# Patient Record
Sex: FEMALE | Race: OTHER | NOT HISPANIC OR LATINO | URBAN - METROPOLITAN AREA
[De-identification: names, ages, dates, MRNs, and addresses within clinical notes are randomized per-mention and may not be internally consistent; named-entity substitution may affect disease eponyms.]

---
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
## 2023-01-25 NOTE — ED PROVIDER NOTE - NSFOLLOWUPINSTRUCTIONS_ED_ALL_ED_FT
Left third message for patient to schedule appointment to see Dr. Maicol Cassidy for AAA.
See your doctor in 1-2 days.   Use cool mist humidifier in room at night.        Keep hydrated by drinking water.                  Log Out.      [employer] CareNotes®     :  [facility]  	                       LARYNGITIS - General Information           Laryngitis    WHAT YOU NEED TO KNOW:    What is laryngitis? Laryngitis is inflammation of your larynx (voice box). The larynx holds your vocal cords. Your vocal cords usually open and close easily to form sounds. With laryngitis, your vocal cords swell and become irritated. This may change how your voice sounds, or you may lose your voice for a short while.    What causes laryngitis?   •Overuse of voice from shouting, singing loudly, or speaking loudly      •Irritation from smoke, pollution, chemicals, or pollen      •Infection with a virus, bacteria, or fungus      •Certain medical conditions, such as acid reflux      What are the signs and symptoms of laryngitis?   •A weak voice or loss of voice      •Hoarse, raspy voice      •Sore, dry, raw throat      •Clearing your throat often      •Dry cough      How is laryngitis diagnosed? Your healthcare provider may ask about your symptoms and examine your throat. He or she may use a light and tiny mirror to see your vocal cords. Instead, a thin, flexible tube may be inserted through your mouth to the back of your throat. This is called a laryngoscopy. Sometimes, a small sample of tissue from the area is needed. This is called a biopsy. The tissue sample is sent to a lab for tests.    How is laryngitis treated? Laryngitis usually gets better on its own within 1 to 2 weeks. Medicines such as steroids or antibiotics may be used in some cases.    How can I manage my symptoms?   •Rest your voice. Try to talk as little as possible.      •Use a cool mist humidifier to increase air moisture in your home. This may soothe your throat and decrease your cough.      •Keep your mouth and throat moist. Suck on a throat lozenge or chew sugarless gum.      •Do not smoke, and avoid secondhand smoke. Nicotine and other chemicals in cigarettes and cigars can cause dryness and irritation in your throat and vocal cords. Ask your healthcare provider for information if you currently smoke and need help to quit. E-cigarettes or smokeless tobacco still contain nicotine. Talk to your healthcare provider before you use these products.      •Drink liquids as directed. You may need to drink extra liquids to help soothe your throat. Water or warm tea are good liquids to drink.      •Avoid spicy and acidic foods. These may irritate your throat. Examples include citrus, salad dressings, and hot sauces. Carbonated drinks may also cause discomfort in your throat.      •Try not to clear your throat. This can cause more irritation and swelling of your vocal cords.      Call your local emergency number (911 in the [place]) if:   •You have sudden trouble breathing.      •You have severe drooling or trouble swallowing.      When should I seek immediate care?   •You cough up blood.      •You have severe pain.      When should I call my doctor?   •Your symptoms last longer than 2 weeks.      •You have questions or concerns about your condition or care.      CARE AGREEMENT:    You have the right to help plan your care. Learn about your health condition and how it may be treated. Discuss treatment options with your healthcare providers to decide what care you want to receive. You always have the right to refuse treatment.        © Copyright [employer] 2022           back to top                          © Copyright [employer] 2022

## 2023-03-09 ENCOUNTER — NON-APPOINTMENT (OUTPATIENT)
Age: 18
End: 2023-03-09

## 2023-04-25 ENCOUNTER — NON-APPOINTMENT (OUTPATIENT)
Age: 18
End: 2023-04-25

## 2023-04-26 ENCOUNTER — EMERGENCY (EMERGENCY)
Facility: HOSPITAL | Age: 18
LOS: 0 days | Discharge: LEFT BEFORE DISCHARGE | End: 2023-04-26
Attending: EMERGENCY MEDICINE
Payer: COMMERCIAL

## 2023-04-26 VITALS
TEMPERATURE: 98 F | HEART RATE: 88 BPM | RESPIRATION RATE: 20 BRPM | OXYGEN SATURATION: 100 % | SYSTOLIC BLOOD PRESSURE: 95 MMHG | DIASTOLIC BLOOD PRESSURE: 65 MMHG

## 2023-04-26 DIAGNOSIS — Z86.59 PERSONAL HISTORY OF OTHER MENTAL AND BEHAVIORAL DISORDERS: ICD-10-CM

## 2023-04-26 DIAGNOSIS — N93.9 ABNORMAL UTERINE AND VAGINAL BLEEDING, UNSPECIFIED: ICD-10-CM

## 2023-04-26 DIAGNOSIS — Z53.29 PROCEDURE AND TREATMENT NOT CARRIED OUT BECAUSE OF PATIENT'S DECISION FOR OTHER REASONS: ICD-10-CM

## 2023-04-26 LAB
ALBUMIN SERPL ELPH-MCNC: 3.8 G/DL — SIGNIFICANT CHANGE UP (ref 3.3–5)
ALP SERPL-CCNC: 63 U/L — SIGNIFICANT CHANGE UP (ref 40–120)
ALT FLD-CCNC: 42 U/L — SIGNIFICANT CHANGE UP (ref 12–78)
ANION GAP SERPL CALC-SCNC: 3 MMOL/L — LOW (ref 5–17)
APPEARANCE UR: CLEAR — SIGNIFICANT CHANGE UP
AST SERPL-CCNC: 23 U/L — SIGNIFICANT CHANGE UP (ref 15–37)
BASOPHILS # BLD AUTO: 0.06 K/UL — SIGNIFICANT CHANGE UP (ref 0–0.2)
BASOPHILS NFR BLD AUTO: 0.8 % — SIGNIFICANT CHANGE UP (ref 0–2)
BILIRUB SERPL-MCNC: 0.2 MG/DL — SIGNIFICANT CHANGE UP (ref 0.2–1.2)
BILIRUB UR-MCNC: NEGATIVE — SIGNIFICANT CHANGE UP
BUN SERPL-MCNC: 9 MG/DL — SIGNIFICANT CHANGE UP (ref 7–23)
CALCIUM SERPL-MCNC: 9.2 MG/DL — SIGNIFICANT CHANGE UP (ref 8.5–10.1)
CHLORIDE SERPL-SCNC: 107 MMOL/L — SIGNIFICANT CHANGE UP (ref 96–108)
CO2 SERPL-SCNC: 27 MMOL/L — SIGNIFICANT CHANGE UP (ref 22–31)
COLOR SPEC: YELLOW — SIGNIFICANT CHANGE UP
CREAT SERPL-MCNC: 0.73 MG/DL — SIGNIFICANT CHANGE UP (ref 0.5–1.3)
DIFF PNL FLD: ABNORMAL
EGFR: 121 ML/MIN/1.73M2 — SIGNIFICANT CHANGE UP
EOSINOPHIL # BLD AUTO: 0.08 K/UL — SIGNIFICANT CHANGE UP (ref 0–0.5)
EOSINOPHIL NFR BLD AUTO: 1 % — SIGNIFICANT CHANGE UP (ref 0–6)
GLUCOSE SERPL-MCNC: 93 MG/DL — SIGNIFICANT CHANGE UP (ref 70–99)
GLUCOSE UR QL: NEGATIVE — SIGNIFICANT CHANGE UP
HCG SERPL-ACNC: <1 MIU/ML — SIGNIFICANT CHANGE UP
HCT VFR BLD CALC: 40.6 % — SIGNIFICANT CHANGE UP (ref 34.5–45)
HGB BLD-MCNC: 13.5 G/DL — SIGNIFICANT CHANGE UP (ref 11.5–15.5)
IMM GRANULOCYTES NFR BLD AUTO: 0.3 % — SIGNIFICANT CHANGE UP (ref 0–0.9)
KETONES UR-MCNC: NEGATIVE — SIGNIFICANT CHANGE UP
LEUKOCYTE ESTERASE UR-ACNC: ABNORMAL
LIDOCAIN IGE QN: 173 U/L — SIGNIFICANT CHANGE UP (ref 73–393)
LYMPHOCYTES # BLD AUTO: 2.27 K/UL — SIGNIFICANT CHANGE UP (ref 1–3.3)
LYMPHOCYTES # BLD AUTO: 29.4 % — SIGNIFICANT CHANGE UP (ref 13–44)
MCHC RBC-ENTMCNC: 27.8 PG — SIGNIFICANT CHANGE UP (ref 27–34)
MCHC RBC-ENTMCNC: 33.3 GM/DL — SIGNIFICANT CHANGE UP (ref 32–36)
MCV RBC AUTO: 83.7 FL — SIGNIFICANT CHANGE UP (ref 80–100)
MONOCYTES # BLD AUTO: 0.55 K/UL — SIGNIFICANT CHANGE UP (ref 0–0.9)
MONOCYTES NFR BLD AUTO: 7.1 % — SIGNIFICANT CHANGE UP (ref 2–14)
NEUTROPHILS # BLD AUTO: 4.74 K/UL — SIGNIFICANT CHANGE UP (ref 1.8–7.4)
NEUTROPHILS NFR BLD AUTO: 61.4 % — SIGNIFICANT CHANGE UP (ref 43–77)
NITRITE UR-MCNC: NEGATIVE — SIGNIFICANT CHANGE UP
PH UR: 7 — SIGNIFICANT CHANGE UP (ref 5–8)
PLATELET # BLD AUTO: 308 K/UL — SIGNIFICANT CHANGE UP (ref 150–400)
POTASSIUM SERPL-MCNC: 3.9 MMOL/L — SIGNIFICANT CHANGE UP (ref 3.5–5.3)
POTASSIUM SERPL-SCNC: 3.9 MMOL/L — SIGNIFICANT CHANGE UP (ref 3.5–5.3)
PROT SERPL-MCNC: 8.3 GM/DL — SIGNIFICANT CHANGE UP (ref 6–8.3)
PROT UR-MCNC: NEGATIVE — SIGNIFICANT CHANGE UP
RBC # BLD: 4.85 M/UL — SIGNIFICANT CHANGE UP (ref 3.8–5.2)
RBC # FLD: 13.6 % — SIGNIFICANT CHANGE UP (ref 10.3–14.5)
SODIUM SERPL-SCNC: 137 MMOL/L — SIGNIFICANT CHANGE UP (ref 135–145)
SP GR SPEC: 1.01 — SIGNIFICANT CHANGE UP (ref 1.01–1.02)
UROBILINOGEN FLD QL: NEGATIVE — SIGNIFICANT CHANGE UP
WBC # BLD: 7.72 K/UL — SIGNIFICANT CHANGE UP (ref 3.8–10.5)
WBC # FLD AUTO: 7.72 K/UL — SIGNIFICANT CHANGE UP (ref 3.8–10.5)

## 2023-04-26 PROCEDURE — 81001 URINALYSIS AUTO W/SCOPE: CPT

## 2023-04-26 PROCEDURE — 87086 URINE CULTURE/COLONY COUNT: CPT

## 2023-04-26 PROCEDURE — 86850 RBC ANTIBODY SCREEN: CPT

## 2023-04-26 PROCEDURE — 76817 TRANSVAGINAL US OBSTETRIC: CPT | Mod: 26

## 2023-04-26 PROCEDURE — 86900 BLOOD TYPING SEROLOGIC ABO: CPT

## 2023-04-26 PROCEDURE — 76817 TRANSVAGINAL US OBSTETRIC: CPT

## 2023-04-26 PROCEDURE — 99284 EMERGENCY DEPT VISIT MOD MDM: CPT

## 2023-04-26 PROCEDURE — 36415 COLL VENOUS BLD VENIPUNCTURE: CPT

## 2023-04-26 PROCEDURE — 99284 EMERGENCY DEPT VISIT MOD MDM: CPT | Mod: 25

## 2023-04-26 PROCEDURE — 86901 BLOOD TYPING SEROLOGIC RH(D): CPT

## 2023-04-26 PROCEDURE — 85025 COMPLETE CBC W/AUTO DIFF WBC: CPT

## 2023-04-26 PROCEDURE — 83690 ASSAY OF LIPASE: CPT

## 2023-04-26 PROCEDURE — 84702 CHORIONIC GONADOTROPIN TEST: CPT

## 2023-04-26 PROCEDURE — 80053 COMPREHEN METABOLIC PANEL: CPT

## 2023-04-26 RX ORDER — SODIUM CHLORIDE 9 MG/ML
1000 INJECTION INTRAMUSCULAR; INTRAVENOUS; SUBCUTANEOUS ONCE
Refills: 0 | Status: COMPLETED | OUTPATIENT
Start: 2023-04-26 | End: 2023-04-26

## 2023-04-26 RX ADMIN — SODIUM CHLORIDE 1000 MILLILITER(S): 9 INJECTION INTRAMUSCULAR; INTRAVENOUS; SUBCUTANEOUS at 20:43

## 2023-04-26 NOTE — ED STATDOCS - CLINICAL SUMMARY MEDICAL DECISION MAKING FREE TEXT BOX
patient with +home preg test, s/p plan b x2, LMP 3/28. Sent in by UC for further workup. Plan for labs HCG, TVUS and reassess.

## 2023-04-26 NOTE — ED STATDOCS - PROGRESS NOTE DETAILS
16 yo female with no significant PMH presents with vaginal spotting x 2 days. Pt took 2 home preg test which were positive and went to urgent care and was told that their preg test was negative. Pt has 2 episodes of unprotected intercourse and took plan B both times. Pt's LMP was 2/28/23 and is normally regular. Will check labs, UA, hcg, sono and reeval. -Topher Bob PA-C HCG is negative. Discussed with pt. Possible spotting or irregular periods after taking plan B 2x. Pending sono. -Topher Bob PA-C Pt does not want to wait for the sono. Pt is willing to sign out AMA.   Had an at length discussion with patient.  Patient wishes to leave at this time.  The patient understands that they are leaving against medical advice despite the risk of missing a potentially serious diagnosis which may lead to injury, disability and/or death.  I discussed with the patient which tests would need to be performed and what type of monitoring would be necessary for the patient as well.  I was unable to convince patient to stay for further workup.  The patient was given an opportunity to ask any questions as well.   The patient demonstrates understanding of all risks, is awake and alert and demonstrates competence to make medical decisions.  The patient understands that they may return at any time if desired. Discussed the importance of close, prompt medical follow-up. -Topher Bob PA-C

## 2023-04-26 NOTE — ED STATDOCS - NSFOLLOWUPINSTRUCTIONS_ED_ALL_ED_FT
Follow up with your GYN for further evaluation of the bleeding.   Return to the ER for any new or other concerns.

## 2023-04-26 NOTE — ED STATDOCS - PHYSICAL EXAMINATION
General: AAOx3, NAD  HEENT: NCAT  Cardiac: Normal rate and rhythym, no murmurs, normal peripheral perfusion  Respiratory: Normal rate and effort. CTAB  GI: Soft, nondistended, +minimal b/l lower abd tenderness   Neuro: No focal deficits. CHAPARRO equally x4, sensation to light touch intact throughout  MSK: FROMx4, no focal bony tenderness, no peripheral edema  Skin: No rash

## 2023-04-26 NOTE — ED ADULT NURSE NOTE - OBJECTIVE STATEMENT
Ambulatory to ER for confirmation of pregnancy. Patient had positive pregnancy test at home; negative pregnancy test at urgent care; sent to ER for further eval. Patient c/o vaginal bleeding x 2 days; a & ox4 respirations even and unlabored on room air. Await MD davila.

## 2023-04-26 NOTE — ED ADULT NURSE NOTE - CHIEF COMPLAINT QUOTE
pt ambulatory to triage from , pt states she took a pregnancy problem at home that was positive. pt went to  and tests did not confirm pregnancy. pt states she has been having spotting. pt was advised to come to ED for further testing. allergic to sulfa. pmh migranes

## 2023-04-26 NOTE — ED STATDOCS - PATIENT PORTAL LINK FT
You can access the FollowMyHealth Patient Portal offered by [facility] by registering at the following website: [email or web address]. By joining [employer]’s FollowMyHealth portal, you will also be able to view your health information using other applications (apps) compatible with our system.

## 2023-04-26 NOTE — ED STATDOCS - OBJECTIVE STATEMENT
17 year old female presents to the ED after taking two home pregnancy tests x2 positive, but was seen at  where urine test was negative for pregnancy. Sent to ED for further testing. States she's been spotting x2 days, not like during menstrual cycle. LMP: 3/28. Patient is sexually active, state's she's used plan B x2. Also reports recent bacterial infection, placed on antibiotics but only took for a day.

## 2023-04-26 NOTE — ED ADULT TRIAGE NOTE - CHIEF COMPLAINT QUOTE
pt ambulatory to triage from , pt states she took a pregnancy problem at home that was positive. pt went to  and tests did not confirm pregnancy. pt states she has been having spotting. pt was advised to come to ED for further testing. allergic to sulfa. pmh migranes [Mother] : mother [Expressed Breast milk ___oz/feed] : [unfilled] oz of expressed breast milk per feed [Formula ___ oz/feed] : [unfilled] oz of formula per feed [Hours between feeds ___] : Child is fed every [unfilled] hours [Normal] : Normal [In Bassin[place]/Crib] : sleeps in bassinette/crib [On back] : sleeps on back [Pacifier use] : Pacifier use [Water heater temperature set at <120 degrees F] : Water heater temperature set at <120 degrees F [Yes] : Cigarette smoke exposure [Rear facing car seat in back seat] : Rear facing car seat in back seat [Carbon Monoxide Detectors] : Carbon monoxide detectors at home [Smoke Detectors] : Smoke detectors at home. [Exposure to electronic nicotine delivery system] : No exposure to electronic nicotine delivery system [FreeTextEntry7] : 1 month Well Visit [de-identified] : Hep B [FreeTextEntry1] : No c/o, pt doing well.  \par No feeding difficulties, no sweating with feeding \par Prenatal heart rate was 140-150, had decels and nuchal cord x 1 at delivery, no other complications\par MGF suffered a stroke in his late 40's\par

## 2023-04-27 NOTE — ED POST DISCHARGE NOTE - REASON FOR FOLLOW-UP
Other Patient requesting US report. Signed out AMA yesterday prior to reading. Report reviewed over phone. Also advised of negative HCG. - Perry Bar PA-C

## 2023-04-28 LAB
CULTURE RESULTS: SIGNIFICANT CHANGE UP
SPECIMEN SOURCE: SIGNIFICANT CHANGE UP

## 2023-08-13 ENCOUNTER — NON-APPOINTMENT (OUTPATIENT)
Age: 18
End: 2023-08-13

## 2023-09-27 ENCOUNTER — NON-APPOINTMENT (OUTPATIENT)
Age: 18
End: 2023-09-27

## 2023-11-24 ENCOUNTER — NON-APPOINTMENT (OUTPATIENT)
Age: 18
End: 2023-11-24

## 2024-01-08 ENCOUNTER — NON-APPOINTMENT (OUTPATIENT)
Age: 19
End: 2024-01-08

## 2024-02-19 ENCOUNTER — EMERGENCY (EMERGENCY)
Facility: HOSPITAL | Age: 19
LOS: 0 days | Discharge: HOME | End: 2024-02-19
Attending: STUDENT IN AN ORGANIZED HEALTH CARE EDUCATION/TRAINING PROGRAM
Payer: COMMERCIAL

## 2024-02-19 VITALS
HEART RATE: 85 BPM | DIASTOLIC BLOOD PRESSURE: 90 MMHG | TEMPERATURE: 98 F | OXYGEN SATURATION: 99 % | SYSTOLIC BLOOD PRESSURE: 127 MMHG | RESPIRATION RATE: 18 BRPM

## 2024-02-19 VITALS — HEIGHT: 60 IN | WEIGHT: 205.03 LBS
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
## 2024-02-19 DIAGNOSIS — S09.90XA UNSPECIFIED INJURY OF HEAD, INITIAL ENCOUNTER: ICD-10-CM
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
## 2024-02-19 DIAGNOSIS — F07.81 POSTCONCUSSIONAL SYNDROME: ICD-10-CM
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
## 2024-02-19 PROCEDURE — 99283 EMERGENCY DEPT VISIT LOW MDM: CPT

## 2024-02-19 NOTE — ED PROVIDER NOTE - PATIENT PORTAL LINK FT
You can access the FollowMyHealth Patient Portal offered by [facility] by registering at the following website: [email or web address]. By joining [employer]’s FollowMyHealth portal, you will also be able to view your health information using other applications (apps) compatible with our system.

## 2024-02-19 NOTE — ED PROVIDER NOTE - NS ED ATTENDING STATEMENT MOD
Subjective:       Patient ID: Jerome Almazan is a 53 y.o. male.    Chief Complaint: Executive Health    HPI Mr Almazan is a very pleasant gentleman from [place], [place] here for his Executive Health exam. Overall doing well and had no specific complaints. We discussed however the fact that he has experienced lower back pain for the last couple of months. He has not had any imaging studies to look into this, but he recently had a CT scan of his abdomen for pain issues he reported to his doctor, that included, among the images obtained, a representation of his spinal column. I showed Mr Almazan how this study showed the loss of the lumbar lordosis indicating the probability of lower back muscles strain. I gave hin a limited prescription for Gabapentin with instructions to take at bedtime as needed. I also recommended certain core strengthening exercises that should help in the long run. Otherwise he is doing well. He is very active physically, exercises regularly and has maintained his weight.  I am sending copies of his studies including blood work that showed no significant changes from previous tests. PSA was stable at 3.0 as were other parameters tested including normal EKG.  Review of Systems   All other systems reviewed and are negative.      Objective:      Physical Exam   Constitutional: He is oriented to person, place, and time. He appears well-developed and well-nourished. No distress.   HENT:   Head: Normocephalic and atraumatic.   Right Ear: External ear normal.   Left Ear: External ear normal.   Mouth/Throat: Oropharynx is clear and moist. No oropharyngeal exudate.   Eyes: Pupils are equal, round, and reactive to light. Conjunctivae and EOM are normal. Right eye exhibits no discharge. Left eye exhibits no discharge. No scleral icterus.   Neck: Normal range of motion. Neck supple. No JVD present. No thyromegaly present.   Cardiovascular: Normal rate, regular rhythm, normal heart sounds and intact distal  pulses.   No murmur heard.  Pulmonary/Chest: Effort normal and breath sounds normal. No respiratory distress. He has no wheezes. He exhibits no tenderness.   Abdominal: Soft. Bowel sounds are normal. He exhibits no distension and no mass. There is no tenderness.   Musculoskeletal: Normal range of motion. He exhibits no edema or tenderness.   Lymphadenopathy:     He has no cervical adenopathy.   Neurological: He is alert and oriented to person, place, and time. No cranial nerve deficit. Coordination normal.   Skin: Skin is warm and dry. No rash noted. He is not diaphoretic. No erythema.   Psychiatric: He has a normal mood and affect. His behavior is normal. Judgment and thought content normal.   Nursing note and vitals reviewed.      Assessment:       1. Routine general medical examination at a health care facility    2. Strain of lumbar region, sequela     3.     Hypertension - on treatmet.  Plan:    1. Provided prescription for Gabapentin 300 mgs at bedtime as needed.         2. Continue with current regular medications.         3. Return to clinic in 1 year or sooner if needed.     Attending with

## 2024-02-19 NOTE — ED PROVIDER NOTE - ATTENDING CONTRIBUTION TO CARE
I, Johnson Herbert DO,  performed the initial face to face bedside interview with this patient regarding history of present illness, review of symptoms and relevant past medical, social and family history.  I completed an independent physical examination.  I was the initial provider who evaluated this patient. I have signed out the follow up of any pending tests (i.e. labs, radiological studies) to the resident.  I have communicated the patient’s plan of care and disposition with the resident.  The history, relevant review of systems, past medical and surgical history, medical decision making, and physical examination was documented by the scribe in my presence and I attest to the accuracy of the documentation.    shared pe  · Physical Examination: GENERAL: no acute distress, non-toxic appearing  	HEAD: normocephalic, atraumatic, no skull deformities or tenderness  	HEENT: PERRLA, EOMI, normal conjunctiva, oral mucosa moist, full ROM of neck  	CARDIAC: regular rate and rhythm, no appreciable murmurs  	PULM: clear to ascultation bilaterally  	GI: abdomen nondistended, soft, nontender  	NEURO: alert and oriented x 3, normal speech, no focal motor or sensory deficits, gait normal, no gross neurologic deficit, CN intact  	MSK: no visible deformities  	SKIN: no visible rashes, dry, well-perfused  PSYCH: appropriate mood and affect

## 2024-02-19 NOTE — ED PROVIDER NOTE - OBJECTIVE STATEMENT
Patient is a 18y F PMHx migraines p/w head injury. Patient states 2 days ago she was at work and was bent over when a large box with ink cartridges fell on her head. Patient states she vomiting x1 at time of injury. Since then she has felt a little nauseous, no vomiting. Patient state she is often nauseous at baseline. Patient also feeling some dizziness and posterior headache, but is able to ambulate and does not feel off balance. Patient denies LOC at time of incident, not on a/c, no period of confusion. Denies numbness/weakness, gait instability, vision changes.

## 2024-02-19 NOTE — ED ADULT TRIAGE NOTE - CHIEF COMPLAINT QUOTE
Pt presents to ED sent from urgent care for head CT. Pt states she had boxes of ink cartridges fall on her head at work on Saturday Pt states "On Saturday after iot happened I threw up, then yesterday I felt dizzy, nauseous and had a persistent headache." I just continue to feel nauseous w/ a headache. No neuro deficit observed, no facial asymmetry, equal  strength bilaterally, no arm/leg drift, positive strength to all four extremities, and positive sensation at this time.

## 2024-02-19 NOTE — ED PROVIDER NOTE - PHYSICAL EXAMINATION
GENERAL: no acute distress, non-toxic appearing  HEAD: normocephalic, atraumatic, no skull deformities or tenderness  HEENT: PERRLA, EOMI, normal conjunctiva, oral mucosa moist, full ROM of neck  CARDIAC: regular rate and rhythm, no appreciable murmurs  PULM: clear to ascultation bilaterally  GI: abdomen nondistended, soft, nontender  NEURO: alert and oriented x 3, normal speech, no focal motor or sensory deficits, gait normal, no gross neurologic deficit, CN intact  MSK: no visible deformities  SKIN: no visible rashes, dry, well-perfused  PSYCH: appropriate mood and affect

## 2024-02-19 NOTE — ED PROVIDER NOTE - CLINICAL SUMMARY MEDICAL DECISION MAKING FREE TEXT BOX
Patient is a 18y F PMHx migraines p/w head injury. Patient states 2 days ago she was at work and was bent over when a large box with ink cartridges fell on her head. Patient states she vomiting x1 at time of injury. Since then she has felt a little nauseous, no vomiting. Patient state she is often nauseous at baseline. Patient also feeling some dizziness and posterior headache, but is able to ambulate and does not feel off balance. Patient denies LOC at time of incident, not on a/c, no period of confusion. Denies numbness/weakness, gait instability, vision changes.    Patient does not need head CT by [facility] CT rule. Patient likely with a concussion, do not suspect ICH. Patient not on A/c, no FND. Will give information on concussions. Patient was educated in room regarding precautions to take.

## 2024-02-19 NOTE — ED PROVIDER NOTE - NSFOLLOWUPINSTRUCTIONS_ED_ALL_ED_FT
You were seen in the ED for a head injury, you most likely have a concussion.    If you want to follow-up with the concussion clinic, please call the number [phone] for an appointment.     WHAT YOU NEED TO KNOW:    What is a concussion? A concussion is a mild brain injury. It is usually caused by a bump or blow to the head from a fall, a motor vehicle crash, or a sports injury. Being shaken forcefully may also cause a concussion.    What are the signs and symptoms of a concussion? Symptoms may happen right away, or they may develop days after the concussion:    Headache    Dizziness, loss of balance, or blurry vision    Nausea or vomiting    A change in mood, such as restlessness or irritability    Trouble thinking, remembering things, or concentrating    Ringing in the ears    Drowsiness or decreased energy    Changes in your normal sleeping pattern  How is a concussion diagnosed? Your healthcare provider will examine you and ask about your symptoms. Tell your provider when and how you were injured. You may need any of the following:    A neurologic exam is also called neuro signs, neuro checks, or neuro status. A neurologic exam can show healthcare providers how well your brain works after your injury. Healthcare providers will check how your pupils react to light. They may check your memory and how easily you wake up. Your hand grasp and balance may also be tested.    CT or MRI pictures may be taken of your head. You may be given contrast liquid to help the pictures show up better. Tell the healthcare provider if you have ever had an allergic reaction to contrast liquid. Do not enter the MRI room with anything metal. Metal can cause serious injury. Tell the healthcare provider if you have any metal in or on your body.  How is a concussion managed? Usually no treatment is needed for a mild concussion. Concussion symptoms usually go away within 10 days, but they may last longer. The following may be recommended to manage your symptoms:    Rest from physical and mental activities as directed. Mental activities are those that require thinking, concentration, and attention. You will need to rest until your symptoms are gone. Rest will allow you to recover from your concussion. Ask your healthcare provider when you can return to work and other daily activities.    Have someone stay with you for the first 24 hours after your injury. Your healthcare provider should be contacted if your symptoms get worse, or you develop new symptoms.    Do not participate in sports and physical activities until your healthcare provider says it is okay. These can make your symptoms worse or lead to another concussion. Your healthcare provider will tell you when it is okay for you to return to sports or physical activities. Ask for more information about sports concussions.    Do not use heavy machinery or drive for 24 hours after your injury, or as directed. This can be dangerous and cause a serious accident. Your healthcare provider will tell you when it is safe for you to return to these activities.    Pain medicine may help relieve headache pain. Do not use NSAIDs or aspirin. These can increase your risk for bleeding. Your provider may recommend acetaminophen. Ask how much to take and how often to take it. Follow directions. Read the labels of all other medicines you are using to see if they also contain acetaminophen, or ask your doctor or pharmacist. Acetaminophen can cause liver damage if not taken correctly.  How can I help prevent another concussion?    Wear protective sports equipment that fits properly. Helmets help decrease your risk for a serious brain injury. Talk to your healthcare provider about ways you can lower your risk for a concussion if you play sports.    Wear your seatbelt every time you travel. This helps lower your risk for a head injury if you are in a car accident.  Where can I get more information?    [employer]  [address] [place] [address]  Phone: [phone]  Phone: [phone]  Web Address: http://www.appbackr  Call your local emergency number (911 in the ), or have someone call if:    You cannot be woken.    You have a seizure, increasing confusion, or a change in personality.    Your speech becomes slurred.  When should I seek immediate care?    You have sudden or new vision problems.    One of your pupils is bigger than the other.    You have a severe headache that does not go away.    You have arm or leg weakness, numbness, or new problems with coordination.    You have blood or clear fluid coming out of the ears or nose.    You cannot stop vomiting.  When should I call my doctor?    You have nausea or are vomiting.    You feel more sleepy than usual.    Your symptoms get worse.    Your symptoms last longer than 6 weeks.    You have questions or concerns about your condition or care.

## 2024-03-18 NOTE — ED PROVIDER NOTE - ATTENDING WITH...
Hide Include Location In Plan Question?: No Detail Level: Zone Include Location In Plan?: Yes Resident

## 2024-03-28 ENCOUNTER — NON-APPOINTMENT (OUTPATIENT)
Age: 19
End: 2024-03-28

## 2024-05-01 NOTE — ED ADULT NURSE NOTE - HIV OFFER
HISTORY AND PHYSICAL       Patient: Treva CARR Acosta 59 year old male    MRN: [id number]      I have reviewed the H&P, I have examined the patient, and there are no changes to the patient's condition.  The risks and benefits of this procedure have been discussed with the pt.  All questions have been answered.       Opt out

## 2024-05-21 ENCOUNTER — EMERGENCY (EMERGENCY)
Facility: HOSPITAL | Age: 19
LOS: 0 days | Discharge: HOME | End: 2024-05-22
Attending: EMERGENCY MEDICINE
Payer: COMMERCIAL

## 2024-05-21 VITALS
DIASTOLIC BLOOD PRESSURE: 75 MMHG | TEMPERATURE: 98 F | OXYGEN SATURATION: 98 % | HEART RATE: 87 BPM | RESPIRATION RATE: 18 BRPM | SYSTOLIC BLOOD PRESSURE: 122 MMHG

## 2024-05-21 DIAGNOSIS — R10.9 UNSPECIFIED ABDOMINAL PAIN: ICD-10-CM

## 2024-05-21 DIAGNOSIS — Z86.69 PERSONAL HISTORY OF OTHER DISEASES OF THE NERVOUS SYSTEM AND SENSE ORGANS: ICD-10-CM

## 2024-05-21 DIAGNOSIS — R79.89 OTHER SPECIFIED ABNORMAL FINDINGS OF BLOOD CHEMISTRY: ICD-10-CM

## 2024-05-21 PROCEDURE — 85379 FIBRIN DEGRADATION QUANT: CPT

## 2024-05-21 PROCEDURE — 96375 TX/PRO/DX INJ NEW DRUG ADDON: CPT

## 2024-05-21 PROCEDURE — 76830 TRANSVAGINAL US NON-OB: CPT

## 2024-05-21 PROCEDURE — 83690 ASSAY OF LIPASE: CPT

## 2024-05-21 PROCEDURE — 76705 ECHO EXAM OF ABDOMEN: CPT

## 2024-05-21 PROCEDURE — 83605 ASSAY OF LACTIC ACID: CPT

## 2024-05-21 PROCEDURE — 87086 URINE CULTURE/COLONY COUNT: CPT

## 2024-05-21 PROCEDURE — 81001 URINALYSIS AUTO W/SCOPE: CPT

## 2024-05-21 PROCEDURE — 84484 ASSAY OF TROPONIN QUANT: CPT

## 2024-05-21 PROCEDURE — 99285 EMERGENCY DEPT VISIT HI MDM: CPT

## 2024-05-21 PROCEDURE — 84702 CHORIONIC GONADOTROPIN TEST: CPT

## 2024-05-21 PROCEDURE — 71275 CT ANGIOGRAPHY CHEST: CPT | Mod: MC

## 2024-05-21 PROCEDURE — 96374 THER/PROPH/DIAG INJ IV PUSH: CPT | Mod: XU

## 2024-05-21 PROCEDURE — 80053 COMPREHEN METABOLIC PANEL: CPT

## 2024-05-21 PROCEDURE — 74178 CT ABD&PLV WO CNTR FLWD CNTR: CPT | Mod: MC

## 2024-05-21 PROCEDURE — 85025 COMPLETE CBC W/AUTO DIFF WBC: CPT

## 2024-05-21 PROCEDURE — 93975 VASCULAR STUDY: CPT

## 2024-05-21 PROCEDURE — 36415 COLL VENOUS BLD VENIPUNCTURE: CPT

## 2024-05-21 PROCEDURE — 99285 EMERGENCY DEPT VISIT HI MDM: CPT | Mod: 25

## 2024-05-21 RX ORDER — MORPHINE SULFATE 50 MG/1
2 CAPSULE, EXTENDED RELEASE ORAL ONCE
Refills: 0 | Status: DISCONTINUED | OUTPATIENT
Start: 2024-05-21 | End: 2024-05-21

## 2024-05-21 RX ORDER — SODIUM CHLORIDE 9 MG/ML
1000 INJECTION INTRAMUSCULAR; INTRAVENOUS; SUBCUTANEOUS ONCE
Refills: 0 | Status: COMPLETED | OUTPATIENT
Start: 2024-05-21 | End: 2024-05-21

## 2024-05-21 NOTE — ED ADULT TRIAGE NOTE - CHIEF COMPLAINT QUOTE
Pt ambulatory to ED with c/o upper abdominal pain x Friday. Pt endorses N/V/D and blood in stool. PMH anxiety and migraines. Pt took Gas x PTA with no relief.

## 2024-05-21 NOTE — ED ADULT NURSE NOTE - OBJECTIVE STATEMENT
20 y/o female awake alert and oriented x 4 presents to ED abd pain. Pt c/o upper abd pain x 2 days associated with nausea and diarrhea. Denies fever/chills, chest pain or sob or urinary sx. Pt took gas x with no relief. pt noticed dark red blood in stool today. not on blood thinners.

## 2024-05-21 NOTE — ED ADULT NURSE NOTE - DRUG PRE-SCREENING (DAST -1)
Statement Selected [Chest Discomfort] : chest discomfort [Negative] : Heme/Lymph [SOB] : no shortness of breath [Palpitations] : no palpitations

## 2024-05-21 NOTE — ED ADULT NURSE NOTE - CHIEF COMPLAINT QUOTE
Detail Level: Generalized
Size Of Lesion In Cm (Optional): 0
Pt ambulatory to ED with c/o upper abdominal pain x Friday. Pt endorses N/V/D and blood in stool. PMH anxiety and migraines. Pt took Gas x PTA with no relief.

## 2024-05-22 VITALS
TEMPERATURE: 99 F | OXYGEN SATURATION: 100 % | SYSTOLIC BLOOD PRESSURE: 113 MMHG | DIASTOLIC BLOOD PRESSURE: 69 MMHG | RESPIRATION RATE: 16 BRPM | HEART RATE: 93 BPM

## 2024-05-22 PROBLEM — G43.909 MIGRAINE, UNSPECIFIED, NOT INTRACTABLE, WITHOUT STATUS MIGRAINOSUS: Chronic | Status: ACTIVE | Noted: 2024-02-19

## 2024-05-22 LAB
ADD ON TEST-SPECIMEN IN LAB: SIGNIFICANT CHANGE UP
ALBUMIN SERPL ELPH-MCNC: 3.8 G/DL — SIGNIFICANT CHANGE UP (ref 3.3–5)
ALP SERPL-CCNC: 64 U/L — SIGNIFICANT CHANGE UP (ref 40–120)
ALT FLD-CCNC: 98 U/L — HIGH (ref 12–78)
ANION GAP SERPL CALC-SCNC: 6 MMOL/L — SIGNIFICANT CHANGE UP (ref 5–17)
APPEARANCE UR: ABNORMAL
AST SERPL-CCNC: 53 U/L — HIGH (ref 15–37)
BACTERIA # UR AUTO: ABNORMAL /HPF
BASOPHILS # BLD AUTO: 0.05 K/UL — SIGNIFICANT CHANGE UP (ref 0–0.2)
BASOPHILS NFR BLD AUTO: 0.7 % — SIGNIFICANT CHANGE UP (ref 0–2)
BILIRUB SERPL-MCNC: 0.2 MG/DL — SIGNIFICANT CHANGE UP (ref 0.2–1.2)
BILIRUB UR-MCNC: NEGATIVE — SIGNIFICANT CHANGE UP
BUN SERPL-MCNC: 9 MG/DL — SIGNIFICANT CHANGE UP (ref 7–23)
CALCIUM SERPL-MCNC: 9.3 MG/DL — SIGNIFICANT CHANGE UP (ref 8.5–10.1)
CAST: 0 /LPF — SIGNIFICANT CHANGE UP (ref 0–4)
CHLORIDE SERPL-SCNC: 111 MMOL/L — HIGH (ref 96–108)
CO2 SERPL-SCNC: 24 MMOL/L — SIGNIFICANT CHANGE UP (ref 22–31)
COLOR SPEC: YELLOW — SIGNIFICANT CHANGE UP
CREAT SERPL-MCNC: 0.78 MG/DL — SIGNIFICANT CHANGE UP (ref 0.5–1.3)
DIFF PNL FLD: ABNORMAL
EGFR: 111 ML/MIN/1.73M2 — SIGNIFICANT CHANGE UP
EOSINOPHIL # BLD AUTO: 0.18 K/UL — SIGNIFICANT CHANGE UP (ref 0–0.5)
EOSINOPHIL NFR BLD AUTO: 2.5 % — SIGNIFICANT CHANGE UP (ref 0–6)
GLUCOSE SERPL-MCNC: 77 MG/DL — SIGNIFICANT CHANGE UP (ref 70–99)
GLUCOSE UR QL: NEGATIVE MG/DL — SIGNIFICANT CHANGE UP
HCG SERPL-ACNC: <1 MIU/ML — SIGNIFICANT CHANGE UP
HCT VFR BLD CALC: 42.4 % — SIGNIFICANT CHANGE UP (ref 34.5–45)
HGB BLD-MCNC: 14.5 G/DL — SIGNIFICANT CHANGE UP (ref 11.5–15.5)
IMM GRANULOCYTES NFR BLD AUTO: 0.3 % — SIGNIFICANT CHANGE UP (ref 0–0.9)
KETONES UR-MCNC: ABNORMAL MG/DL
LACTATE SERPL-SCNC: 0.9 MMOL/L — SIGNIFICANT CHANGE UP (ref 0.7–2)
LEUKOCYTE ESTERASE UR-ACNC: ABNORMAL
LIDOCAIN IGE QN: 40 U/L — SIGNIFICANT CHANGE UP (ref 13–75)
LYMPHOCYTES # BLD AUTO: 2.3 K/UL — SIGNIFICANT CHANGE UP (ref 1–3.3)
LYMPHOCYTES # BLD AUTO: 32.3 % — SIGNIFICANT CHANGE UP (ref 13–44)
MCHC RBC-ENTMCNC: 29.5 PG — SIGNIFICANT CHANGE UP (ref 27–34)
MCHC RBC-ENTMCNC: 34.2 GM/DL — SIGNIFICANT CHANGE UP (ref 32–36)
MCV RBC AUTO: 86.2 FL — SIGNIFICANT CHANGE UP (ref 80–100)
MONOCYTES # BLD AUTO: 0.63 K/UL — SIGNIFICANT CHANGE UP (ref 0–0.9)
MONOCYTES NFR BLD AUTO: 8.8 % — SIGNIFICANT CHANGE UP (ref 2–14)
NEUTROPHILS # BLD AUTO: 3.94 K/UL — SIGNIFICANT CHANGE UP (ref 1.8–7.4)
NEUTROPHILS NFR BLD AUTO: 55.4 % — SIGNIFICANT CHANGE UP (ref 43–77)
NITRITE UR-MCNC: NEGATIVE — SIGNIFICANT CHANGE UP
PH UR: 6 — SIGNIFICANT CHANGE UP (ref 5–8)
PLATELET # BLD AUTO: 254 K/UL — SIGNIFICANT CHANGE UP (ref 150–400)
POTASSIUM SERPL-MCNC: 3.7 MMOL/L — SIGNIFICANT CHANGE UP (ref 3.5–5.3)
POTASSIUM SERPL-SCNC: 3.7 MMOL/L — SIGNIFICANT CHANGE UP (ref 3.5–5.3)
PROT SERPL-MCNC: 7.6 GM/DL — SIGNIFICANT CHANGE UP (ref 6–8.3)
PROT UR-MCNC: NEGATIVE MG/DL — SIGNIFICANT CHANGE UP
RBC # BLD: 4.92 M/UL — SIGNIFICANT CHANGE UP (ref 3.8–5.2)
RBC # FLD: 13 % — SIGNIFICANT CHANGE UP (ref 10.3–14.5)
RBC CASTS # UR COMP ASSIST: 3 /HPF — SIGNIFICANT CHANGE UP (ref 0–4)
SODIUM SERPL-SCNC: 141 MMOL/L — SIGNIFICANT CHANGE UP (ref 135–145)
SP GR SPEC: 1.02 — SIGNIFICANT CHANGE UP (ref 1–1.03)
SQUAMOUS # UR AUTO: 22 /HPF — HIGH (ref 0–5)
TROPONIN I, HIGH SENSITIVITY RESULT: <3 NG/L — SIGNIFICANT CHANGE UP
UROBILINOGEN FLD QL: 0.2 MG/DL — SIGNIFICANT CHANGE UP (ref 0.2–1)
WBC # BLD: 7.12 K/UL — SIGNIFICANT CHANGE UP (ref 3.8–10.5)
WBC # FLD AUTO: 7.12 K/UL — SIGNIFICANT CHANGE UP (ref 3.8–10.5)
WBC UR QL: 6 /HPF — HIGH (ref 0–5)

## 2024-05-22 PROCEDURE — 93975 VASCULAR STUDY: CPT | Mod: 26

## 2024-05-22 PROCEDURE — 71275 CT ANGIOGRAPHY CHEST: CPT | Mod: 26,MC

## 2024-05-22 PROCEDURE — 74178 CT ABD&PLV WO CNTR FLWD CNTR: CPT | Mod: 26,MC

## 2024-05-22 PROCEDURE — 76830 TRANSVAGINAL US NON-OB: CPT | Mod: 26

## 2024-05-22 PROCEDURE — 76705 ECHO EXAM OF ABDOMEN: CPT | Mod: 26,59

## 2024-05-22 RX ORDER — CEPHALEXIN 500 MG
1 CAPSULE ORAL
Qty: 10 | Refills: 0
Start: 2024-05-22 | End: 2024-05-26

## 2024-05-22 RX ORDER — CEFTRIAXONE 500 MG/1
1000 INJECTION, POWDER, FOR SOLUTION INTRAMUSCULAR; INTRAVENOUS ONCE
Refills: 0 | Status: COMPLETED | OUTPATIENT
Start: 2024-05-22 | End: 2024-05-22

## 2024-05-22 RX ORDER — SODIUM CHLORIDE 9 MG/ML
1000 INJECTION INTRAMUSCULAR; INTRAVENOUS; SUBCUTANEOUS ONCE
Refills: 0 | Status: COMPLETED | OUTPATIENT
Start: 2024-05-22 | End: 2024-05-22

## 2024-05-22 RX ADMIN — SODIUM CHLORIDE 1000 MILLILITER(S): 9 INJECTION INTRAMUSCULAR; INTRAVENOUS; SUBCUTANEOUS at 00:19

## 2024-05-22 RX ADMIN — SODIUM CHLORIDE 1000 MILLILITER(S): 9 INJECTION INTRAMUSCULAR; INTRAVENOUS; SUBCUTANEOUS at 01:38

## 2024-05-22 RX ADMIN — CEFTRIAXONE 1000 MILLIGRAM(S): 500 INJECTION, POWDER, FOR SOLUTION INTRAMUSCULAR; INTRAVENOUS at 01:38

## 2024-05-22 RX ADMIN — MORPHINE SULFATE 2 MILLIGRAM(S): 50 CAPSULE, EXTENDED RELEASE ORAL at 00:19

## 2024-05-22 NOTE — ED PROVIDER NOTE - CARE PROVIDERS DIRECT ADDRESSES
[email or web address]

## 2024-05-22 NOTE — ED PROVIDER NOTE - CLINICAL SUMMARY MEDICAL DECISION MAKING FREE TEXT BOX
abdominal pain, labs, imaging, r/o visceral pathology, obstruction, etc. labs and imaging reviewed. VSS, no hematochezia or melena in ER, pain improved, Pt with slightly elevated liver function testing compared to previous labs (AST and ALT). Patient provided with results of all labs and imaging including all incidental findings and the need to see a gastroenterologist as soon as possible to evaluate the abnormal labs. Patient is AAOx4, full capacity and judgment, agrees. Pt also has had recent increased in use of tylenol. Patient told to stop using tylenol until seen by gastroenterology. Strict return precautions if any blood in stool, if any weakness, or if any chest discomfort. Dimer elevated but CT angio shows no evidence of PE.

## 2024-05-22 NOTE — ED PROVIDER NOTE - NSFOLLOWUPINSTRUCTIONS_ED_ALL_ED_FT
Please note that I have provided you with the results of your labs and imaging including the reports of your CAT scan imaging, and all the findings including incidental findings as well as the results of the ultrasound imaging. As discussed labs are mostly within normal range except a few labs in your liver that are abnormally high namely the AST and ALT blood works. Please not that we do not have enough resources in the ER to diagnose the cause, however I have provided you with an outpatient follow up with a gastroenterologist who if you call and stated you were in ER, can potentially see you today and tomorrow. Please note that you need to have repeat blood work in a day or two and if you can do that via your gastroenterologist, you can return to us in the emergency room. Please also note that I have reviewed all your imaging including the incidental findings in the CAT scan and ultrasound. Please take these copies provided for you for your primary care and the gastroenterologist.    You have no chest pain, shortness of breath or upper back pain, but should these symptoms start, return to us immediately as we have to evaluate your heart further.    While CAT scan or CT scan imaging is an ideal imaging in the emergent setting, at times it is not the most sensitive in diagnosis of all disease process. Please note that you have been provided written copies of the imaging and that you can access real time reports using the patient portal link. You can also have your primary care provider or your specialist, pull up the real time reports or review images. All CT type images are read by our radiology specialist and ER defers to their expertise on findings. As stated there are certain pathologies that can be missed even with the best attention to detail, due to the inherent limitation of the CAT scan imaging such as ulcers, small masses or tumors or even certain cancers. Also note that most if not all CAT scan imaging can find incidental findings. Incidental findings are reported findings that are not the main goal behind doing the CAT scan but are details that the radiologist sees that he feels the patient should know about. Generally most incidental findings are benign but NOT always. So please make sure you report all incidental findings to your primary care provider and your specialist, as they might need further imaging or testing to rule out certain conditions. You can potentially need other imaging such as MRI (magnetic resonance imaging), gastric endoscopy, colonoscopy, etc. Sometimes nothing needs to be done, but this is a decision to be made by the primary or specialist so please inform them of the findings.     For all other health concerns, return to us immediately.     _______________    Abdominal Pain, Adult  A health care provider talking to a person during a medical exam.  Pain in the abdomen (abdominal pain) can be caused by many things. In most cases, it gets better with no treatment or by being treated at home. But in some cases, it can be serious.    Your health care provider will ask questions about your medical history and do a physical exam to try to figure out what is causing your pain.    Follow these instructions at home:  Medicines    Take over-the-counter and prescription medicines only as told by your provider.  Do not take medicines that help you poop (laxatives) unless told by your provider.  General instructions    Watch your condition for any changes.  Drink enough fluid to keep your pee (urine) pale yellow.  Contact a health care provider if:  Your pain changes, gets worse, or lasts longer than expected.  You have severe cramping or bloating in your abdomen, or you vomit.  Your pain gets worse with meals, after eating, or with certain foods.  You are constipated or have diarrhea for more than 2–3 days.  You are not hungry, or you lose weight without trying.  You have signs of dehydration. These may include:  Dark pee, very little pee, or no pee.  Cracked lips or dry mouth.  Sleepiness or weakness.  You have pain when you pee (urinate) or poop.  Your abdominal pain wakes you up at night.  You have blood in your pee.  You have a fever.  Get help right away if:  You cannot stop vomiting.  Your pain is only in one part of the abdomen. Pain on the right side could be caused by appendicitis.  You have bloody or black poop (stool), or poop that looks like tar.  You have trouble breathing.  You have chest pain.  These symptoms may be an emergency. Get help right away. Call 911.  Do not wait to see if the symptoms will go away.  Do not drive yourself to the hospital.  This information is not intended to replace advice given to you by your health care provider. Make sure you discuss any questions you have with your health care provider.

## 2024-05-22 NOTE — ED PROVIDER NOTE - OBJECTIVE STATEMENT
no 19 year old female with PMH of migraine, previous drug use?, presents with cc of abdominal pain, pleuritic in nature. No cp, sob or palpitation. No visual or focal neurological complaints. No recent trauma. No saddle anesthesia. No incontinence of urine or stool. No melena or hematochezia. No weakness in arms or legs. No difficulty ambulating. No seizures or syncope. No HA or head pain. No neck stiffness. No saddle anesthesia. No vaginal bleeding or dc. Not trying to be pregnant. No known hx of STD or STIs as per patient. No recent drug use.

## 2024-05-22 NOTE — ED PROVIDER NOTE - DIFFERENTIAL DIAGNOSIS
abdominal pain, labs, imaging, r/o visceral pathology, obstruction, etc. labs and imaging reviewed. VSS, no hematochezia or melena in ER, pain improved, Pt with slightly elevated liver function testing compared to previous labs (AST and ALT). Patient provided with results of all labs and imaging including all incidental findings and the need to see a gastroenterologist as soon as possible to evaluate the abnormal labs. Patient is AAOx4, full capacity and judgment, agrees. Pt also has had recent increased in use of tylenol. Patient told to stop using tylenol until seen by gastroenterology. Strict return precautions if any blood in stool, if any weakness, or if any chest discomfort. Dimer elevated but CT angio shows no evidence of PE. Differential Diagnosis

## 2024-05-22 NOTE — ED PROVIDER NOTE - MUSCULOSKELETAL, MLM
Spine appears normal, range of motion is not limited, no muscle or joint tenderness 5/5 strength on flexion and extension of all limbs. No nuchal rigidity. No saddle anesthesia.

## 2024-05-22 NOTE — ED PROVIDER NOTE - CARE PROVIDER_API CALL
Johnson Ruff  Gastroenterology  [address] [place] [address]  Phone: [phone]  Fax: [phone]  Follow Up Time:     LEANDER ZARATE  [address] [place] [address]  Phone: [phone]  Fax: [phone]  Follow Up Time:

## 2024-05-22 NOTE — ED PROVIDER NOTE - PATIENT PORTAL LINK FT
You can access the FollowMyHealth Patient Portal offered by [facility] by registering at the following website: [email or web address]. By joining [facility]’s FollowMyHealth portal, you will also be able to view your health information using other applications (apps) compatible with our system.

## 2024-05-23 ENCOUNTER — APPOINTMENT (OUTPATIENT)
Age: 19
End: 2024-05-23
Payer: COMMERCIAL

## 2024-05-23 VITALS
SYSTOLIC BLOOD PRESSURE: 110 MMHG | WEIGHT: 200 LBS | HEIGHT: 61 IN | DIASTOLIC BLOOD PRESSURE: 70 MMHG | BODY MASS INDEX: 37.76 KG/M2

## 2024-05-23 DIAGNOSIS — R10.9 UNSPECIFIED ABDOMINAL PAIN: ICD-10-CM

## 2024-05-23 DIAGNOSIS — R79.89 OTHER SPECIFIED ABNORMAL FINDINGS OF BLOOD CHEMISTRY: ICD-10-CM

## 2024-05-23 DIAGNOSIS — K21.9 GASTRO-ESOPHAGEAL REFLUX DISEASE W/OUT ESOPHAGITIS: ICD-10-CM

## 2024-05-23 DIAGNOSIS — K76.0 FATTY (CHANGE OF) LIVER, NOT ELSEWHERE CLASSIFIED: ICD-10-CM

## 2024-05-23 DIAGNOSIS — K62.5 HEMORRHAGE OF ANUS AND RECTUM: ICD-10-CM

## 2024-05-23 PROBLEM — Z00.00 ENCOUNTER FOR PREVENTIVE HEALTH EXAMINATION: Status: ACTIVE | Noted: 2024-05-23

## 2024-05-23 LAB
CULTURE RESULTS: SIGNIFICANT CHANGE UP
SPECIMEN SOURCE: SIGNIFICANT CHANGE UP

## 2024-05-23 PROCEDURE — 99204 OFFICE O/P NEW MOD 45 MIN: CPT

## 2024-05-23 RX ORDER — SODIUM SULFATE, POTASSIUM SULFATE AND MAGNESIUM SULFATE 1.6; 3.13; 17.5 G/177ML; G/177ML; G/177ML
17.5-3.13-1.6 SOLUTION ORAL
Qty: 2 | Refills: 0 | Status: ACTIVE | COMMUNITY
Start: 2024-05-23 | End: 1900-01-01

## 2024-05-23 RX ORDER — PANTOPRAZOLE 40 MG/1
40 TABLET, DELAYED RELEASE ORAL DAILY
Qty: 90 | Refills: 1 | Status: ACTIVE | COMMUNITY
Start: 2024-05-23 | End: 1900-01-01

## 2024-07-23 ENCOUNTER — EMERGENCY (EMERGENCY)
Facility: HOSPITAL | Age: 19
LOS: 0 days | Discharge: HOME | End: 2024-07-24
Attending: EMERGENCY MEDICINE
Payer: COMMERCIAL

## 2024-07-23 VITALS
RESPIRATION RATE: 19 BRPM | HEIGHT: 61 IN | DIASTOLIC BLOOD PRESSURE: 90 MMHG | HEART RATE: 90 BPM | SYSTOLIC BLOOD PRESSURE: 151 MMHG | OXYGEN SATURATION: 100 % | WEIGHT: 203.05 LBS | TEMPERATURE: 98 F
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
## 2024-07-23 DIAGNOSIS — S93.401A SPRAIN OF UNSPECIFIED LIGAMENT OF RIGHT ANKLE, INITIAL ENCOUNTER: ICD-10-CM

## 2024-07-23 DIAGNOSIS — M25.571 PAIN IN RIGHT ANKLE AND JOINTS OF RIGHT FOOT: ICD-10-CM

## 2024-07-23 PROCEDURE — 73590 X-RAY EXAM OF LOWER LEG: CPT | Mod: RT

## 2024-07-23 PROCEDURE — 73630 X-RAY EXAM OF FOOT: CPT | Mod: 26,RT

## 2024-07-23 PROCEDURE — 73610 X-RAY EXAM OF ANKLE: CPT | Mod: RT

## 2024-07-23 PROCEDURE — 73610 X-RAY EXAM OF ANKLE: CPT | Mod: 26,RT

## 2024-07-23 PROCEDURE — 73590 X-RAY EXAM OF LOWER LEG: CPT | Mod: 26,RT

## 2024-07-23 PROCEDURE — 99284 EMERGENCY DEPT VISIT MOD MDM: CPT | Mod: 25

## 2024-07-23 PROCEDURE — 73630 X-RAY EXAM OF FOOT: CPT | Mod: RT

## 2024-07-23 PROCEDURE — 99284 EMERGENCY DEPT VISIT MOD MDM: CPT

## 2024-07-23 RX ADMIN — Medication 600 MILLIGRAM(S): at 23:25

## 2024-07-23 NOTE — ED STATDOCS - OBJECTIVE STATEMENT
19-year-old female with no past medical history, who presents with chief complaint of right ankle injury.  Patient states that she missed 1 step, and sustained an inversion ankle injury to her right ankle, fell onto her right side, and felt a crack.  This happened around 10 PM tonight.  She endorses pain of the right ankle, and unable to ambulate secondary to pain.  Denies any other injuries, or symptoms.  No medication taken prior to right arrival.

## 2024-07-23 NOTE — ED STATDOCS - NSFOLLOWUPINSTRUCTIONS_ED_ALL_ED_FT
Ankle Sprain    WHAT YOU NEED TO KNOW:    An ankle sprain happens when 1 or more ligaments in your ankle joint stretch or tear. Ligaments are tough tissues that connect bones. Ligaments support your joints and keep your bones in place.    DISCHARGE INSTRUCTIONS:    Seek care immediately if:    You have severe pain in your ankle.    Your foot or toes are cold or numb.    Your ankle becomes more weak or unstable (wobbly).    You are unable to put any weight on your ankle or foot.    Your swelling has increased or returned.  Call your doctor if:    Your pain does not go away, even after treatment.    You have questions or concerns about your condition or care.  Medicines: You may need any of the following:    NSAIDs, such as ibuprofen, help decrease swelling, pain, and fever. This medicine is available with or without a doctor's order. NSAIDs can cause stomach bleeding or kidney problems in certain people. If you take blood thinner medicine, always ask your healthcare provider if NSAIDs are safe for you. Always read the medicine label and follow directions.    Acetaminophen decreases pain and fever. It is available without a doctor's order. Ask how much to take and how often to take it. Follow directions. Read the labels of all other medicines you are using to see if they also contain acetaminophen, or ask your doctor or pharmacist. Acetaminophen can cause liver damage if not taken correctly.    Prescription pain medicine may be given. Ask your healthcare provider how to take this medicine safely. Some prescription pain medicines contain acetaminophen. Do not take other medicines that contain acetaminophen without talking to your healthcare provider. Too much acetaminophen may cause liver damage. Prescription pain medicine may cause constipation. Ask your healthcare provider how to prevent or treat constipation.    Take your medicine as directed. Contact your healthcare provider if you think your medicine is not helping or if you have side effects. Tell your provider if you are allergic to any medicine. Keep a list of the medicines, vitamins, and herbs you take. Include the amounts, and when and why you take them. Bring the list or the pill bottles to follow-up visits. Carry your medicine list with you in case of an emergency.  Self-care:    Use support devices, such as a brace, cast, or splint, to limit your movement and protect your joint. You may need to use crutches to decrease your pain as you move around.    Go to physical therapy as directed. A physical therapist teaches you exercises to help improve movement and strength, and to decrease pain.    Rest your ankle so that it can heal. Return to normal activities as directed.    Apply ice on your ankle for 15 to 20 minutes every hour or as directed. Use an ice pack, or put crushed ice in a plastic bag. Cover the ice pack or bag with a towel before you put it on your injury. Ice helps prevent tissue damage and decreases swelling and pain.    Compress your ankle. Ask if you should wrap an elastic bandage around your injured ligament. An elastic bandage provides support and helps decrease swelling and movement so your joint can heal. Wear as long as directed.  How to Wrap an Elastic Bandage      Elevate your ankle above the level of your heart as often as you can. This will help decrease swelling and pain. Prop your ankle on pillows or blankets to keep it elevated comfortably.  Elevate Leg  Prevent another ankle sprain:    Let your ankle heal. Find out how long your ligament needs to heal. Do not do any physical activity until your healthcare provider says it is okay. If you start activity too soon, you may develop a more serious injury.    Warm up and stretch before you exercise or play sports. This helps your joints become strong and flexible.    Use the right equipment. Always wear shoes that fit well and are made for the activity that you are doing. You may also need ankle supports, elbow and knee pads, or braces.  Follow up with your doctor as directed: Write down your questions so you remember to ask them during your visits.    © [employer] 1973, 2024    	  back to top            © [employer] 1973, 2024

## 2024-07-23 NOTE — ED STATDOCS - CARE PROVIDER_API CALL
Mike Christensen  Orthopaedic Surgery  [address] [place] [id number]  Phone: [phone]  Fax: [phone]  Follow Up Time:

## 2024-07-23 NOTE — ED STATDOCS - PHYSICAL EXAMINATION
Attempted to call patient regarding her lab results from today's visit.  Left voicemail instructing her to call the clinic back.    Please advise:  Strep PCR positive.  Will offer treatment with antibiotic. Rx is pending, will need to verify pharmacy.  Self quarantine requirements remain the same.    Thank you!    Claire Perales PA-C.     Tenderness palpation and edema over the right lateral malleolus, no crepitus, no tenderness to the proximal lower extremity, or foot.  2+ DP pulses bilaterally, normal sensation, decreased range of motion secondary secondary to pain, compartments are soft.

## 2024-07-23 NOTE — ED STATDOCS - CLINICAL SUMMARY MEDICAL DECISION MAKING FREE TEXT BOX
X-rays performed independently interpreted by myself reveal no acute fracture or dislocation.  Likely ankle sprain.  Ace wrap and Aircast applied, crutches to be provided.  Recommend nonweight bear, ice, Motrin for pain, elevation, and follow-up with orthopedics in the next week.  Strict return precautions given for any worsening.  Patient verbalized understanding and agrees plan.

## 2024-07-23 NOTE — ED STATDOCS - PATIENT PORTAL LINK FT
You can access the FollowMyHealth Patient Portal offered by [facility] by registering at the following website: [email or web address]. By joining [facility]’s FollowMyHealth portal, you will also be able to view your health information using other applications (apps) compatible with our system.

## 2024-07-23 NOTE — ED STATDOCS - CARE PROVIDERS DIRECT ADDRESSES
[email or web address], [email or web address] in the [email or web address]

## 2024-07-23 NOTE — ED ADULT TRIAGE NOTE - CHIEF COMPLAINT QUOTE
patient c/o right ankle pain s/p fall down 1 step.  patient denies hitting head. no deformity noted.  splint applied by EMS

## 2024-07-31 ENCOUNTER — APPOINTMENT (OUTPATIENT)
Dept: ORTHOPEDIC SURGERY | Facility: CLINIC | Age: 19
End: 2024-07-31

## 2024-08-06 ENCOUNTER — APPOINTMENT (OUTPATIENT)
Dept: ORTHOPEDIC SURGERY | Facility: CLINIC | Age: 19
End: 2024-08-06

## 2024-08-06 PROBLEM — S96.919A ANKLE STRAIN: Status: ACTIVE | Noted: 2024-08-06

## 2024-08-06 PROCEDURE — 99203 OFFICE O/P NEW LOW 30 MIN: CPT

## 2024-08-06 NOTE — PHYSICAL EXAM
[de-identified] : Constitutional: Well-nourished, well-developed, No acute distress  Respiratory:  Good respiratory effort, no SOB  Psychiatric: Pleasant and normal affect, alert and oriented x3   Ankle: Skin: Clean dry and intact  Ankle: Moderate swelling ecchymosis of the anterolateral ankle and foot, no marked deformities or malalignment  Tenderness: ATFL, CFL, Lateral ankle  Nontender: medial malleolus, lateral malleolus, tibialis posterior tendon, achilles tendon, no marked thickening of tendon, PTFL, tibiofibular ligament/high ankle, sinus tarsus, deltoid ligaments, 5th metatarsal.   ROM: Mild limitation of PF and Inversion due to pain/swelling, NL DF and Eversion.   Resistive Testing: Mild pain with resisted eversion and DF.  painless resisted  plantar flexion, and inversion.   Special tests: + anterior drawer for pain without laxity, + talar tilt for pain without laxity 5/5 EHL TA GS SILT L3-S1 2+ dp pt wwp bcr  [de-identified] : I independently reviewed and interpreted the xrays of the ankle - no fracture, no dislocation or OA.  No other acute osseous abnormalities.

## 2024-08-06 NOTE — HISTORY OF PRESENT ILLNESS
[de-identified] : 19yF pw R ankle sprain.  She presents after they suffered a twisting injury and noted immediate pain and swelling.  Denies hx of ankle/foot injuries.  Pt notes walking and stairs worsens the pain and symptoms.  The swelling has decreased somewhat in the last 24 hours with icing but ambulation is still painful.  Has not trialed physical therapy or injections.  Pt denies numbness, paresthesias, f/c.  Would like to return to baseline high activity and would like to discuss treatment options.

## 2024-08-06 NOTE — DISCUSSION/SUMMARY
[de-identified] : 19yF pw R ankle strain.  The patient was extensively counseled on treatment options including but not limited to observation, rest/activity modification, bracing, anti-inflammatory medications, physical therapy, injections, and surgery.  The natural history of the disease was thoroughly explained.  We discussed that the majority of the time, this condition can be initially treated conservatively. The patient will proceed with: -brace -pt was instructed on the importance of resting, icing and elevating to minimize swelling   I have personally obtained the history, reviewed the ROS as noted, and performed the physical examination today.  The patient and I discussed the assessment and options and developed the plan.  All questions were answered and the patient stated their understanding of the treatment plan and appreciation of the visit.   My cumulative time spent on this patient's visit included: Preparation for the visit, review of the medical records, review of pertinent diagnostic studies, examination and counseling of the patient on the above diagnosis, treatment plan and prognosis, orders of diagnostic tests, medications and/or appropriate procedures and documentation in the medical records of today's visit.   Kofi Chambers MD

## 2024-08-12 ENCOUNTER — EMERGENCY (EMERGENCY)
Facility: HOSPITAL | Age: 19
LOS: 0 days | Discharge: HOME | End: 2024-08-13
Attending: EMERGENCY MEDICINE
Payer: COMMERCIAL

## 2024-08-12 VITALS
OXYGEN SATURATION: 99 % | RESPIRATION RATE: 18 BRPM | DIASTOLIC BLOOD PRESSURE: 78 MMHG | WEIGHT: 199.96 LBS | SYSTOLIC BLOOD PRESSURE: 105 MMHG | HEART RATE: 115 BPM | TEMPERATURE: 98 F | HEIGHT: 61 IN

## 2024-08-12 DIAGNOSIS — R11.0 NAUSEA: ICD-10-CM

## 2024-08-12 DIAGNOSIS — R42 DIZZINESS AND GIDDINESS: ICD-10-CM

## 2024-08-12 DIAGNOSIS — R00.2 PALPITATIONS: ICD-10-CM

## 2024-08-12 DIAGNOSIS — R00.0 TACHYCARDIA, UNSPECIFIED: ICD-10-CM

## 2024-08-12 DIAGNOSIS — Z86.69 PERSONAL HISTORY OF OTHER DISEASES OF THE NERVOUS SYSTEM AND SENSE ORGANS: ICD-10-CM

## 2024-08-12 DIAGNOSIS — R10.2 PELVIC AND PERINEAL PAIN: ICD-10-CM

## 2024-08-12 LAB
BASOPHILS # BLD AUTO: 0.06 K/UL — SIGNIFICANT CHANGE UP (ref 0–0.2)
BASOPHILS NFR BLD AUTO: 0.5 % — SIGNIFICANT CHANGE UP (ref 0–2)
EOSINOPHIL # BLD AUTO: 0.21 K/UL — SIGNIFICANT CHANGE UP (ref 0–0.5)
EOSINOPHIL NFR BLD AUTO: 1.9 % — SIGNIFICANT CHANGE UP (ref 0–6)
HCT VFR BLD CALC: 44.2 % — SIGNIFICANT CHANGE UP (ref 34.5–45)
HGB BLD-MCNC: 15.5 G/DL — SIGNIFICANT CHANGE UP (ref 11.5–15.5)
IMM GRANULOCYTES NFR BLD AUTO: 0.4 % — SIGNIFICANT CHANGE UP (ref 0–0.9)
LYMPHOCYTES # BLD AUTO: 3.58 K/UL — HIGH (ref 1–3.3)
LYMPHOCYTES # BLD AUTO: 32.5 % — SIGNIFICANT CHANGE UP (ref 13–44)
MCHC RBC-ENTMCNC: 30.3 PG — SIGNIFICANT CHANGE UP (ref 27–34)
MCHC RBC-ENTMCNC: 35.1 GM/DL — SIGNIFICANT CHANGE UP (ref 32–36)
MCV RBC AUTO: 86.5 FL — SIGNIFICANT CHANGE UP (ref 80–100)
MONOCYTES # BLD AUTO: 0.76 K/UL — SIGNIFICANT CHANGE UP (ref 0–0.9)
MONOCYTES NFR BLD AUTO: 6.9 % — SIGNIFICANT CHANGE UP (ref 2–14)
NEUTROPHILS # BLD AUTO: 6.36 K/UL — SIGNIFICANT CHANGE UP (ref 1.8–7.4)
NEUTROPHILS NFR BLD AUTO: 57.8 % — SIGNIFICANT CHANGE UP (ref 43–77)
PLATELET # BLD AUTO: 304 K/UL — SIGNIFICANT CHANGE UP (ref 150–400)
RBC # BLD: 5.11 M/UL — SIGNIFICANT CHANGE UP (ref 3.8–5.2)
RBC # FLD: 13.2 % — SIGNIFICANT CHANGE UP (ref 10.3–14.5)
WBC # BLD: 11.01 K/UL — HIGH (ref 3.8–10.5)
WBC # FLD AUTO: 11.01 K/UL — HIGH (ref 3.8–10.5)

## 2024-08-12 PROCEDURE — 86900 BLOOD TYPING SEROLOGIC ABO: CPT

## 2024-08-12 PROCEDURE — 85025 COMPLETE CBC W/AUTO DIFF WBC: CPT

## 2024-08-12 PROCEDURE — 99284 EMERGENCY DEPT VISIT MOD MDM: CPT

## 2024-08-12 PROCEDURE — 80053 COMPREHEN METABOLIC PANEL: CPT

## 2024-08-12 PROCEDURE — 86850 RBC ANTIBODY SCREEN: CPT

## 2024-08-12 PROCEDURE — 36415 COLL VENOUS BLD VENIPUNCTURE: CPT

## 2024-08-12 PROCEDURE — 86901 BLOOD TYPING SEROLOGIC RH(D): CPT

## 2024-08-12 PROCEDURE — 93970 EXTREMITY STUDY: CPT

## 2024-08-12 PROCEDURE — 81003 URINALYSIS AUTO W/O SCOPE: CPT

## 2024-08-12 PROCEDURE — 76817 TRANSVAGINAL US OBSTETRIC: CPT

## 2024-08-12 PROCEDURE — 99285 EMERGENCY DEPT VISIT HI MDM: CPT | Mod: 25

## 2024-08-12 PROCEDURE — 93005 ELECTROCARDIOGRAM TRACING: CPT

## 2024-08-12 PROCEDURE — 84702 CHORIONIC GONADOTROPIN TEST: CPT

## 2024-08-12 PROCEDURE — 84484 ASSAY OF TROPONIN QUANT: CPT

## 2024-08-12 RX ORDER — BACTERIOSTATIC SODIUM CHLORIDE 0.9 %
1000 VIAL (ML) INJECTION ONCE
Refills: 0 | Status: DISCONTINUED | OUTPATIENT
Start: 2024-08-12 | End: 2024-08-12

## 2024-08-12 RX ORDER — BACTERIOSTATIC SODIUM CHLORIDE 0.9 %
1500 VIAL (ML) INJECTION ONCE
Refills: 0 | Status: COMPLETED | OUTPATIENT
Start: 2024-08-12 | End: 2024-08-12

## 2024-08-12 RX ADMIN — Medication 1500 MILLILITER(S): at 23:50

## 2024-08-12 NOTE — ED ADULT TRIAGE NOTE - CHIEF COMPLAINT QUOTE
Patient had a positive pregnancy test on Wed c/o fast heartbeat, cramping and feeling like she can't take a deep breath.

## 2024-08-12 NOTE — ED PROVIDER NOTE - PATIENT PORTAL LINK FT
You can access the FollowMyHealth Patient Portal offered by [facility] by registering at the following website: [email or web address]. By joining [facility]’s FollowMyHealth portal, you will also be able to view your health information using other applications (apps) compatible with our system.

## 2024-08-12 NOTE — ED PROVIDER NOTE - NSFOLLOWUPINSTRUCTIONS_ED_ALL_ED_FT
Follow-up with your OB/GYN physician within the next 48 hours.  Drink plenty of fluids to stay hydrated.  Return to the emergency department if you have worsening/persistent pain, vaginal bleeding, fever (100.4 °F or higher), weakness, dizziness or further concerns.

## 2024-08-12 NOTE — ED PROVIDER NOTE - CLINICAL SUMMARY MEDICAL DECISION MAKING FREE TEXT BOX
19-year-old G2, P0 Ab1 female with history of migraines presents for evaluation of palpitations, dizziness described as a lightheadedness, pelvic cramping and positive hCG during 2 prior visits at an outside hospital.  The patient notes that she was seen in another emergency department twice with her highest hCG less than 100 and ultrasound not revealing an intrauterine pregnancy or ectopic pregnancy.  Patient states that she has been feeling intermittently short of breath especially when she is laying flat over the last 48 hours approximately.  Patient states that she has been experiencing nausea without vomiting and not drinking as much water as usual.  Patient notes that she feels increasingly dizzy upon sitting and standing.    Patient is currently in no apparent distress but has sinus tachycardia.  This could be due to physiologic changes during early pregnancy, dehydration.  Will get CBC, CMP, type/screen, hCG quantitative, administer IV fluids and reassess.  Will get bilateral lower extremity venous Dopplers to rule out DVT.  Patient states that she would not want to have a CT of her chest with IV contrast at this time but was advised that if her venous Dopplers do show evidence of DVT and there was high enough concern for PE, would recommend the CT be performed.  I do not have a high index of suspicion for DVT/PE at this time.

## 2024-08-13 VITALS
SYSTOLIC BLOOD PRESSURE: 118 MMHG | DIASTOLIC BLOOD PRESSURE: 63 MMHG | TEMPERATURE: 98 F | OXYGEN SATURATION: 98 % | HEART RATE: 90 BPM | RESPIRATION RATE: 16 BRPM

## 2024-08-13 LAB
ALBUMIN SERPL ELPH-MCNC: 4.2 G/DL — SIGNIFICANT CHANGE UP (ref 3.3–5)
ALP SERPL-CCNC: 61 U/L — SIGNIFICANT CHANGE UP (ref 40–120)
ALT FLD-CCNC: 73 U/L — SIGNIFICANT CHANGE UP (ref 12–78)
ANION GAP SERPL CALC-SCNC: 8 MMOL/L — SIGNIFICANT CHANGE UP (ref 5–17)
APPEARANCE UR: CLEAR — SIGNIFICANT CHANGE UP
AST SERPL-CCNC: 37 U/L — SIGNIFICANT CHANGE UP (ref 15–37)
BILIRUB SERPL-MCNC: 0.4 MG/DL — SIGNIFICANT CHANGE UP (ref 0.2–1.2)
BILIRUB UR-MCNC: NEGATIVE — SIGNIFICANT CHANGE UP
BLD GP AB SCN SERPL QL: SIGNIFICANT CHANGE UP
BUN SERPL-MCNC: 8 MG/DL — SIGNIFICANT CHANGE UP (ref 7–23)
CALCIUM SERPL-MCNC: 9.5 MG/DL — SIGNIFICANT CHANGE UP (ref 8.5–10.1)
CHLORIDE SERPL-SCNC: 110 MMOL/L — HIGH (ref 96–108)
CO2 SERPL-SCNC: 22 MMOL/L — SIGNIFICANT CHANGE UP (ref 22–31)
COLOR SPEC: YELLOW — SIGNIFICANT CHANGE UP
CREAT SERPL-MCNC: 0.91 MG/DL — SIGNIFICANT CHANGE UP (ref 0.5–1.3)
DIFF PNL FLD: NEGATIVE — SIGNIFICANT CHANGE UP
EGFR: 92 ML/MIN/1.73M2 — SIGNIFICANT CHANGE UP
GLUCOSE SERPL-MCNC: 97 MG/DL — SIGNIFICANT CHANGE UP (ref 70–99)
GLUCOSE UR QL: NEGATIVE MG/DL — SIGNIFICANT CHANGE UP
HCG SERPL-ACNC: 479 MIU/ML — HIGH
KETONES UR-MCNC: 15 MG/DL
LEUKOCYTE ESTERASE UR-ACNC: NEGATIVE — SIGNIFICANT CHANGE UP
NITRITE UR-MCNC: NEGATIVE — SIGNIFICANT CHANGE UP
PH UR: 6 — SIGNIFICANT CHANGE UP (ref 5–8)
POTASSIUM SERPL-MCNC: 3.8 MMOL/L — SIGNIFICANT CHANGE UP (ref 3.5–5.3)
POTASSIUM SERPL-SCNC: 3.8 MMOL/L — SIGNIFICANT CHANGE UP (ref 3.5–5.3)
PROT SERPL-MCNC: 8.8 GM/DL — HIGH (ref 6–8.3)
PROT UR-MCNC: NEGATIVE MG/DL — SIGNIFICANT CHANGE UP
SODIUM SERPL-SCNC: 140 MMOL/L — SIGNIFICANT CHANGE UP (ref 135–145)
SP GR SPEC: 1.01 — SIGNIFICANT CHANGE UP (ref 1–1.03)
TROPONIN I, HIGH SENSITIVITY RESULT: <3 NG/L — SIGNIFICANT CHANGE UP
UROBILINOGEN FLD QL: 0.2 MG/DL — SIGNIFICANT CHANGE UP (ref 0.2–1)

## 2024-08-13 PROCEDURE — 93970 EXTREMITY STUDY: CPT | Mod: 26

## 2024-08-13 PROCEDURE — 93010 ELECTROCARDIOGRAM REPORT: CPT

## 2024-08-13 PROCEDURE — 76817 TRANSVAGINAL US OBSTETRIC: CPT | Mod: 26

## 2024-08-13 NOTE — ED ADULT NURSE REASSESSMENT NOTE - NS ED NURSE REASSESS COMMENT FT1
Assumed care of pt at 0200. Pt does not offer any complaints at this time. Vial signs stable as per EMR. Safety and comfort measures maintained.      Reviewed discharge instructions with patient, pt verbalized understanding and does not have any outstanding questions.

## 2024-09-27 ENCOUNTER — NON-APPOINTMENT (OUTPATIENT)
Age: 19
End: 2024-09-27

## 2024-12-12 NOTE — ED PEDIATRIC NURSE NOTE - CHIEF COMPLAINT QUOTE
Pt presents to the ED with mother c/o "shakiness all over" with associated N/V s/p taking an edible 1 hour PTA. Pt reports dryness to mouth, and one episode of urinary incontinence. Denies ETOH/SI/HI/hallucinations. Rituxan Pregnancy And Lactation Text: This medication is Pregnancy Category C and it isn't know if it is safe during pregnancy. It is unknown if this medication is excreted in breast milk but similar antibodies are known to be excreted.

## 2025-07-19 ENCOUNTER — NON-APPOINTMENT (OUTPATIENT)
Age: 20
End: 2025-07-19